# Patient Record
Sex: FEMALE | RURAL
[De-identification: names, ages, dates, MRNs, and addresses within clinical notes are randomized per-mention and may not be internally consistent; named-entity substitution may affect disease eponyms.]

---

## 2021-11-12 ENCOUNTER — APPOINTMENT (OUTPATIENT)
Dept: RURAL CLINIC 10 | Age: 36
Setting detail: DERMATOLOGY
End: 2022-09-21

## 2021-11-12 DIAGNOSIS — L98.8 OTHER SPECIFIED DISORDERS OF THE SKIN AND SUBCUTANEOUS TISSUE: ICD-10-CM

## 2021-11-12 PROCEDURE — OTHER ADDITIONAL NOTES: OTHER

## 2021-11-12 NOTE — PROCEDURE: ADDITIONAL NOTES
Additional Notes: [employer] 30 units lot [id number]. Exp 04/24 Additional Notes: [employer] 30 units lot [id number]. Exp 04/24